# Patient Record
Sex: MALE | URBAN - METROPOLITAN AREA
[De-identification: names, ages, dates, MRNs, and addresses within clinical notes are randomized per-mention and may not be internally consistent; named-entity substitution may affect disease eponyms.]

---

## 2019-03-08 DIAGNOSIS — M54.50 LUMBAGO: Primary | ICD-10-CM

## 2019-03-29 ENCOUNTER — CLINICAL SUPPORT (OUTPATIENT)
Dept: REHABILITATION | Facility: HOSPITAL | Age: 54
End: 2019-03-29
Payer: MEDICAID

## 2019-03-29 DIAGNOSIS — G89.29 CHRONIC BILATERAL LOW BACK PAIN WITH LEFT-SIDED SCIATICA: Primary | ICD-10-CM

## 2019-03-29 DIAGNOSIS — M54.42 CHRONIC BILATERAL LOW BACK PAIN WITH LEFT-SIDED SCIATICA: Primary | ICD-10-CM

## 2019-03-29 PROCEDURE — 97161 PT EVAL LOW COMPLEX 20 MIN: CPT

## 2019-03-29 PROCEDURE — 97110 THERAPEUTIC EXERCISES: CPT

## 2019-03-29 NOTE — PLAN OF CARE
Physical Therapy Initial Evaluation     Name: Андрей Rose  Clinic Number: [id number]    Diagnosis:   Encounter Diagnosis   Name Primary?    Chronic bilateral low back pain with left-sided sciatica Yes     Physician: La Nena Fuller PA  Treatment Orders: PT Eval and Treat  No past medical history on file.  No current outpatient medications on file.     No current facility-administered medications for this visit.      Review of patient's allergies indicates:  Allergies not on file    Time In: 9:06  Time Out: 10:00    Evaluation Date: 3/29/19  Visit # authorized: 1/20  Authorization period: 6/8/19  Plan of care Expiration: 5/10/19  MD referral: M54.5 (ICD-10-CM) - Lumbago    Subjective     Patient reports he has pain every day for years. It shifts side to side. Has a lot of pain when he stands up after sitting a while, especially after driving. Feels like he gets stuck after sitting  still. He reports pain when standing and walking. Standing still worse than walking. He has some difficulty finding a comfortable spot sleeping. Condition is worsening over the last few years. Pain worse in the morning. Loosens up as he gets moving. L leg sometimes has pain radiating to foot. Sometimes on R as well. R knee with OA. Pain when squatting/bending. Pt sleeps on sides and back. Pain with ambulating stairs. Pt used to play basketball, jog, softball.   Radicular symptoms:  N/t L LE  Diagnostic Imaging: x-ray for back 2 months ago with pt reporting arthritis   Pain Scale: Андрей rates pain on a scale of 0-10 to be 3 currently; 10 at worst; 1 at best .  Onset: gradual  Aggravating factors:   Morning, bending, sitting walking  Easing factors:  Movement, pain meds  Prior Therapy: PT for knee 2017  Functional Deficits Leading to Referral: pain and limitations with functional mobility  Prior functional status: independent  Social: lives with wife and son, has stairs in home    Occupation:  Contractor, unable to perform bending , chopping                    Pts goals:  Walking, driving, playing basketball    Red flags:  Pt. denies bowel/bladder symptoms. Recent weight loss? denies Constant/Night pain that is unchanging with change of position? Denies PMH of CA? denies    Objective     Posture Alignment: slouched posture    Palpation: minimal TTP at L2 spinous process    LUMBAR SPINE AROM:   Flexion: 20% uses assist of UEs   Extension: 100%   Left Sidebend: 50% * L   Right Sidebend: 75%   Left Rotation: 75%* thoracic spine   Right Rotation: 90%     SEGMENTAL MOBILITY: decreased lumbar mobility, greatest at L2-3    LOWER EXTREMITY STRENGTH:   Left Right   Quadriceps 4+/5 5/5   Hamstrings 4+/5 4+/5   Dorsiflexion 5/5 5/5   Plantarflexion 4+/5 4+/5     Hip flexion 4/5 4/5   PGM 4/5 4+/5   Hip IR 4/5 4/5   Hip ER 4+/5 4+/5   Hip Ext 4+/5 4+/5     Dermatomes: (impaired/normal)     RLE LLE   L2 Intact Impaired: increased   L3 Intact Intact   L4 Intact Intact   L5 Intact Intact   S1 Intact Intact     Special Tests: ((+): pos.; (-): neg.)   · Quadrant test:  NT  · Slump Test: NT  · SLR Test: -  · Pirformis Test: +R  · Neural Tension:-    Flexibility:   · Decreased L HS flexibility  70 deg R 85  Ely's test: +    GAIT: Андрей displays decreased step length.   STS: pt with difficulty coming to upright position, uses UEs for assist    Pt/family was provided educational information, including: role of PT, goals for PT, scheduling - pt verbalized understanding. Discussed insurance limitations with pt.     TREATMENT     PT Evaluation Completed? Yes  Discussed Plan of Care with patient: Yes    Андрей received 10 minutes of therapeutic exercise including:  Thoracic rot with pillow with ball squeeze  PPT  Bridges  Standing ext  4x/day      Written Home Exercises Provided: HEP2GO code: [id number] (see handout)   Андрей demo good understanding of the education provided. Patient demo good return demo of skill  of exercises.    Assessment     Patient is a 53 y.o. male referred to outpatient physical therapy who presents with a PT diagnosis of chronic Low back pain demonstrating joint dysfunction, LE weakness, decrease core stability and motor control, transitions, and functional limitation as described below. Level of complexity is low;  based on patient's past medical history including the below co-morbidities and personal factors; functional limitations, and clinical presentation directly impacting his/her plan of care. Pt demonstrates good rehab potential. Pt will benefit from physcial therapy services in order to maximize pain free functional mobility. The following goals were discussed with the patient and patient is in agreement with them as to be addressed in the treatment plan. Pt was given a HEP consisting of PPT, lumbar ext, bridges, thoracic rotations. Pt instructed to discontinue standing ext if sx increase. Pt verbally understood the instructions as they were given and demonstrated proper form and technique during therapy. Pt was advised to perform these exercises free of pain, and to stop performing them if pain occurs.     History  Co-morbidities and personal factors that may impact the plan of care Examination  Body Structures and Functions, activity limitations and participation restrictions that may impact the plan of care Clinical Presentation   Decision Making/ Complexity Score   Co-morbidities:   GERD            Personal Factors:   no deficits Body Regions: back  lower extremities    Body Systems: ROM  strength  balance  gait  transfers  transitions  motor control      Activity limitations: walking  driving (bike, car, motorcycle)      Participation Restrictions: work, community ambulation, driving       stable and uncomplicated   low     Medical necessity is demonstrated by the following IMPAIRMENTS/PROBLEMS:   1) Pain limiting function   2) Posture dysfunction   3) Core/Lumbar/LE weakness   4)  Decreased thoracic/lumbar joint mobility   5) Decreased Lumbar ROM   6) Decreased soft tissue extensibility/fascia restriction   7) Decreased LE flexibility:    8) Lack of HEP   9) LE paresthesia       Pt's spiritual, cultural and educational needs considered and pt agreeable to plan of care and goals as stated below:     Anticipated Barriers for physical therapy: none    Short Term GOALS: 3 weeks. Pt agrees with goals set.  1. Patient demonstrates independence with HEP.   2. Patient demonstrates independence with Postural Awareness.   3. Patient demonstrates independence with body mechanics.     Long Term GOALS: 6 weeks. Pt agrees with goals set.  1. Patient demonstrates increased lumbar ROM by 25% to improve tolerance to functional activities.   2. Patient demonstrates increased strength BLE's by 1 MMT grade or greater to improve tolerance to functional activities.   3. Patient demonstrates improved overall function per FOTO Lumbar Survey to 44% or less.     Functional Limitations Reports - G Codes  Category: Mobility  Tool: FOTO Lumbar Survey  Score: 54% Limitation   TEST SCORE  Modifier  Impairment Limitation Restriction   0  CH  0 % impaired, limited or restricted   1-9  CI  @ least 1% but less than 20% impaired, limited or restricted   10- 19  CJ  @ least 20%<40% impaired, limited or restricted   20- 29  CK  @ least 40%<60% impaired, limited or restricted   30- 39  CL  @ least 60% <80% impaired, limited or restricted   40- 49  CM  @ least 80%<100% impaired limited or restricted   50/50  CN  100% impaired, limited or restricted     Current/: CK = 54% Limitation  Goal/ : CK = 44% Limitation     PLAN     Pt will be treated by physical therapy 1-3 times a week for 6 weeks for pt. education, HEP, aquatic therapy, therapeutic exercises, neuromuscular re-education, soft tissue and joint mobilizations; and modalities prn to achieve established goals. Андрей may at times be seen by a PTA as part of the Rehab  Team.     Therapist: Leena Leach, PT, DPT  3/29/2019    I certify the need for these services furnished under this plan of treatment and while under my care.  ______________________________ Physician/Referring Practitioner  Date of Signature

## 2019-03-29 NOTE — PROGRESS NOTES
Physical Therapy Initial Evaluation     Name: Андрей Rose  Clinic Number: [id number]    Diagnosis:   Encounter Diagnosis   Name Primary?    Chronic bilateral low back pain with left-sided sciatica Yes     Physician: La Nena Fuller PA  Treatment Orders: PT Eval and Treat  No past medical history on file.  No current outpatient medications on file.     No current facility-administered medications for this visit.      Review of patient's allergies indicates:  Allergies not on file    Time In: 9:06  Time Out: 10:00    Evaluation Date: 3/29/19  Visit # authorized: 1/20  Authorization period: 6/8/19  Plan of care Expiration: 5/10/19  MD referral: M54.5 (ICD-10-CM) - Lumbago    Subjective     Patient reports he has pain every day for years. It shifts side to side. Has a lot of pain when he stands up after sitting a while, especially after driving. Feels like he gets stuck after sitting  still. He reports pain when standing and walking. Standing still worse than walking. He has some difficulty finding a comfortable spot sleeping. Condition is worsening over the last few years. Pain worse in the morning. Loosens up as he gets moving. L leg sometimes has pain radiating to foot. Sometimes on R as well. R knee with OA. Pain when squatting/bending. Pt sleeps on sides and back. Pain with ambulating stairs. Pt used to play basketball, jog, softball.   Radicular symptoms:  N/t L LE  Diagnostic Imaging: x-ray for back 2 months ago with pt reporting arthritis   Pain Scale: Андрей rates pain on a scale of 0-10 to be 3 currently; 10 at worst; 1 at best .  Onset: gradual  Aggravating factors:   Morning, bending, sitting walking  Easing factors:  Movement, pain meds  Prior Therapy: PT for knee 2017  Functional Deficits Leading to Referral: pain and limitations with functional mobility  Prior functional status: independent  Social: lives with wife and son, has stairs in home    Occupation:  Contractor, unable to perform bending , chopping                    Pts goals:  Walking, driving, playing basketball    Red flags:  Pt. denies bowel/bladder symptoms. Recent weight loss? denies Constant/Night pain that is unchanging with change of position? Denies PMH of CA? denies    Objective     Posture Alignment: slouched posture    Palpation: minimal TTP at L2 spinous process    LUMBAR SPINE AROM:   Flexion: 20% uses assist of UEs   Extension: 100%   Left Sidebend: 50% * L   Right Sidebend: 75%   Left Rotation: 75%* thoracic spine   Right Rotation: 90%     SEGMENTAL MOBILITY: decreased lumbar mobility, greatest at L2-3    LOWER EXTREMITY STRENGTH:   Left Right   Quadriceps 4+/5 5/5   Hamstrings 4+/5 4+/5   Dorsiflexion 5/5 5/5   Plantarflexion 4+/5 4+/5     Hip flexion 4/5 4/5   PGM 4/5 4+/5   Hip IR 4/5 4/5   Hip ER 4+/5 4+/5   Hip Ext 4+/5 4+/5     Dermatomes: (impaired/normal)     RLE LLE   L2 Intact Impaired: increased   L3 Intact Intact   L4 Intact Intact   L5 Intact Intact   S1 Intact Intact     Special Tests: ((+): pos.; (-): neg.)   · Quadrant test:  NT  · Slump Test: NT  · SLR Test: -  · Pirformis Test: +R  · Neural Tension:-    Flexibility:   · Decreased L HS flexibility  70 deg R 85  Ely's test: +    GAIT: Андрей displays decreased step length.   STS: pt with difficulty coming to upright position, uses UEs for assist    Pt/family was provided educational information, including: role of PT, goals for PT, scheduling - pt verbalized understanding. Discussed insurance limitations with pt.     TREATMENT     PT Evaluation Completed? Yes  Discussed Plan of Care with patient: Yes    Андрей received 10 minutes of therapeutic exercise including:  Thoracic rot with pillow with ball squeeze  PPT  Bridges  Standing ext  4x/day      Written Home Exercises Provided: HEP2GO code: [id number] (see handout)   Андрей demo good understanding of the education provided. Patient demo good return demo of skill  of exercises.    Assessment     Patient is a 53 y.o. male referred to outpatient physical therapy who presents with a PT diagnosis of chronic Low back pain demonstrating joint dysfunction, LE weakness, decrease core stability and motor control, transitions, and functional limitation as described below. Level of complexity is low;  based on patient's past medical history including the below co-morbidities and personal factors; functional limitations, and clinical presentation directly impacting his/her plan of care. Pt demonstrates good rehab potential. Pt will benefit from physcial therapy services in order to maximize pain free functional mobility. The following goals were discussed with the patient and patient is in agreement with them as to be addressed in the treatment plan. Pt was given a HEP consisting of PPT, lumbar ext, bridges, thoracic rotations. Pt instructed to discontinue standing ext if sx increase. Pt verbally understood the instructions as they were given and demonstrated proper form and technique during therapy. Pt was advised to perform these exercises free of pain, and to stop performing them if pain occurs.     History  Co-morbidities and personal factors that may impact the plan of care Examination  Body Structures and Functions, activity limitations and participation restrictions that may impact the plan of care Clinical Presentation   Decision Making/ Complexity Score   Co-morbidities:   GERD            Personal Factors:   no deficits Body Regions: back  lower extremities    Body Systems: ROM  strength  balance  gait  transfers  transitions  motor control      Activity limitations: walking  driving (bike, car, motorcycle)      Participation Restrictions: work, community ambulation, driving       stable and uncomplicated   low     Medical necessity is demonstrated by the following IMPAIRMENTS/PROBLEMS:   1) Pain limiting function   2) Posture dysfunction   3) Core/Lumbar/LE weakness   4)  Decreased thoracic/lumbar joint mobility   5) Decreased Lumbar ROM   6) Decreased soft tissue extensibility/fascia restriction   7) Decreased LE flexibility:    8) Lack of HEP   9) LE paresthesia       Pt's spiritual, cultural and educational needs considered and pt agreeable to plan of care and goals as stated below:     Anticipated Barriers for physical therapy: none    Short Term GOALS: 3 weeks. Pt agrees with goals set.  1. Patient demonstrates independence with HEP.   2. Patient demonstrates independence with Postural Awareness.   3. Patient demonstrates independence with body mechanics.     Long Term GOALS: 6 weeks. Pt agrees with goals set.  1. Patient demonstrates increased lumbar ROM by 25% to improve tolerance to functional activities.   2. Patient demonstrates increased strength BLE's by 1 MMT grade or greater to improve tolerance to functional activities.   3. Patient demonstrates improved overall function per FOTO Lumbar Survey to 44% or less.     Functional Limitations Reports - G Codes  Category: Mobility  Tool: FOTO Lumbar Survey  Score: 54% Limitation   TEST SCORE  Modifier  Impairment Limitation Restriction   0  CH  0 % impaired, limited or restricted   1-9  CI  @ least 1% but less than 20% impaired, limited or restricted   10- 19  CJ  @ least 20%<40% impaired, limited or restricted   20- 29  CK  @ least 40%<60% impaired, limited or restricted   30- 39  CL  @ least 60% <80% impaired, limited or restricted   40- 49  CM  @ least 80%<100% impaired limited or restricted   50/50  CN  100% impaired, limited or restricted     Current/: CK = 54% Limitation  Goal/ : CK = 44% Limitation     PLAN     Pt will be treated by physical therapy 1-3 times a week for 6 weeks for pt. education, HEP, aquatic therapy, therapeutic exercises, neuromuscular re-education, soft tissue and joint mobilizations; and modalities prn to achieve established goals. Андрей may at times be seen by a PTA as part of the Rehab  Team.     Therapist: Leena Leach, PT, DPT  3/29/2019    I certify the need for these services furnished under this plan of treatment and while under my care.  ______________________________ Physician/Referring Practitioner  Date of Signature

## 2019-04-01 ENCOUNTER — CLINICAL SUPPORT (OUTPATIENT)
Dept: REHABILITATION | Facility: HOSPITAL | Age: 54
End: 2019-04-01
Payer: MEDICAID

## 2019-04-01 DIAGNOSIS — G89.29 CHRONIC BILATERAL LOW BACK PAIN WITH LEFT-SIDED SCIATICA: ICD-10-CM

## 2019-04-01 DIAGNOSIS — M54.42 CHRONIC BILATERAL LOW BACK PAIN WITH LEFT-SIDED SCIATICA: ICD-10-CM

## 2019-04-01 PROCEDURE — 97140 MANUAL THERAPY 1/> REGIONS: CPT

## 2019-04-01 PROCEDURE — 97110 THERAPEUTIC EXERCISES: CPT

## 2019-04-01 NOTE — PROGRESS NOTES
"                                                    Physical Therapy Daily Note     Name: Андрей Rose  Clinic Number: [id number]  Diagnosis:   Encounter Diagnosis   Name Primary?    Chronic bilateral low back pain with left-sided sciatica      Physician: La Nena Fuller, PA  Precautions: standard  Visit #: 2 of 20  PTA Visit #: 0  Time In: 10:00  Time Out: 10:55     Initial Evaluation Date: 3/29/19  POC Expiration: 5/10/19    Subjective     Pt reports symptoms are the same since last visit.  Pain Scale: Андрей rates pain at low back on a scale of 0-10 to be 5 currently.     Objective     Андрей received individual therapeutic exercises to develop strength, endurance, ROM, flexibility, posture and core stabilization for 47 minutes including:    Bike x 5 min    Thoracic rot with pillow with ball squeeze 20x-  PPT 20x5"-  Bridges 3 x 10-  Prone hip extensions 2 x 10-  Prone press ups 2 x 10-  Standing ext  4x/day-  LTR x 10-    Shuttle 4c 3 x 10-      Standing rows GTB 3 x 10-  Standing shoulder GTB 3 x 10-  Standing wedge stretch 2 x 1 min-  Crabwalks c/ GTB 2laps        Андрей received the following manual therapy techniques:for 8 minutes including:    PAULA thomson grade 2 and 3  STM to paraspinals    Written Home Exercises Provided: at eval  Pt demo good understanding of the education provided. Андрей demonstrated good return demonstration of activities.     Education provided re: importance of HEP  Андрей verbalized good understanding of education provided.   No spiritual or educational barriers to learning provided    Assessment     Patient tolerated all interventions well without any complaints of pain or discomfort.  Pt hypomobile throughout vertebral column. PT added multiple exercises at today's session.  Continue to progress based on pt's tolerance.  This is a 53 y.o. male referred to outpatient physical therapy and presents with a PT diagnosis of chronic low back pain and demonstrates limitations as " described in the problem list. Pt prognosis is Good. Pt will continue to benefit from skilled outpatient physical therapy to address the deficits listed in the problem list, provide pt/family education and to maximize pt's level of independence in the home and community environment.     Short Term GOALS: 3 weeks. Pt agrees with goals set.  1. Patient demonstrates independence with HEP.   2. Patient demonstrates independence with Postural Awareness.   3. Patient demonstrates independence with body mechanics.      Long Term GOALS: 6 weeks. Pt agrees with goals set.  1. Patient demonstrates increased lumbar ROM by 25% to improve tolerance to functional activities.   2. Patient demonstrates increased strength BLE's by 1 MMT grade or greater to improve tolerance to functional activities.   3. Patient demonstrates improved overall function per FOTO Lumbar Survey to 44% or less.        Plan     Continue with established Plan of Care towards PT goals.    Therapist: Brandt Monique, PT,DPT  4/1/2019

## 2019-04-05 ENCOUNTER — CLINICAL SUPPORT (OUTPATIENT)
Dept: REHABILITATION | Facility: HOSPITAL | Age: 54
End: 2019-04-05
Payer: MEDICAID

## 2019-04-05 DIAGNOSIS — G89.29 CHRONIC BILATERAL LOW BACK PAIN WITH LEFT-SIDED SCIATICA: ICD-10-CM

## 2019-04-05 DIAGNOSIS — M54.42 CHRONIC BILATERAL LOW BACK PAIN WITH LEFT-SIDED SCIATICA: ICD-10-CM

## 2019-04-05 PROCEDURE — 97110 THERAPEUTIC EXERCISES: CPT

## 2019-04-05 NOTE — PROGRESS NOTES
"                                                    Physical Therapy Daily Note     Name: Андрей Rose  Clinic Number: [id number]  Diagnosis:   No diagnosis found.  Physician: La Nena Fuller PA  Precautions: standard  Visit #: 2 of 20  PTA Visit #: 0  Time In: 9:00  Time Out: 9:55     Initial Evaluation Date: 3/29/19  POC Expiration: 5/10/19    Subjective     Pt reports symptoms are worse. States he was hanging sheet rock and it aggravated his lower back .  Pain Scale: Андрей rates pain at low back on a scale of 0-10 to be 5 currently.     Objective     Андрей received individual therapeutic exercises to develop strength, endurance, ROM, flexibility, posture and core stabilization for 47 minutes including:  MHP 10 min   Bike x 5 min    Thoracic rot with pillow with ball squeeze 20x-  PPT 20x5"-  Bridges 3 x 10-  Prone hip extensions 2 x 10-  Prone press ups 2 x 10-  Standing ext  4x/day-  LTR x 10-    Shuttle 4c 3 x 10-   NT   Standing rows GTB 3 x 10-  Standing shoulder GTB 3 x 10-  Standing wedge stretch 2 x 1 min- NT  Crabwalks c/ GTB 2laps- NT        Андрей received the following manual therapy techniques:for 8 minutes including:    PAULA thomson grade 2 and 3  STM to paraspinals    Written Home Exercises Provided: at eval  Pt demo good understanding of the education provided. Андрей demonstrated good return demonstration of activities.     Education provided re: importance of HEP  Андрей verbalized good understanding of education provided.   No spiritual or educational barriers to learning provided    Assessment     Completed therex listed above. Started with [facility]GP which gave him temporary relief. Held off on This is a 53 y.o. male referred to outpatient physical therapy and presents with a PT diagnosis of chronic low back pain and demonstrates limitations as described in the problem list. Pt prognosis is Good. Pt will continue to benefit from skilled outpatient physical therapy to address the deficits listed in " the problem list, provide pt/family education and to maximize pt's level of independence in the home and community environment.     Short Term GOALS: 3 weeks. Pt agrees with goals set.  1. Patient demonstrates independence with HEP.   2. Patient demonstrates independence with Postural Awareness.   3. Patient demonstrates independence with body mechanics.      Long Term GOALS: 6 weeks. Pt agrees with goals set.  1. Patient demonstrates increased lumbar ROM by 25% to improve tolerance to functional activities.   2. Patient demonstrates increased strength BLE's by 1 MMT grade or greater to improve tolerance to functional activities.   3. Patient demonstrates improved overall function per FOTO Lumbar Survey to 44% or less.        Plan     Continue with established Plan of Care towards PT goals.    Therapist: Kirt Rubio, PT,DPT  4/5/2019

## 2019-04-09 ENCOUNTER — CLINICAL SUPPORT (OUTPATIENT)
Dept: REHABILITATION | Facility: HOSPITAL | Age: 54
End: 2019-04-09
Payer: MEDICAID

## 2019-04-09 DIAGNOSIS — M54.42 CHRONIC BILATERAL LOW BACK PAIN WITH LEFT-SIDED SCIATICA: ICD-10-CM

## 2019-04-09 DIAGNOSIS — G89.29 CHRONIC BILATERAL LOW BACK PAIN WITH LEFT-SIDED SCIATICA: ICD-10-CM

## 2019-04-09 PROCEDURE — 97110 THERAPEUTIC EXERCISES: CPT

## 2019-04-09 NOTE — PROGRESS NOTES
"                                                    Physical Therapy Daily Note     Name: Андрей Rose  Clinic Number: [id number]  Diagnosis:   Encounter Diagnosis   Name Primary?    Chronic bilateral low back pain with left-sided sciatica      Physician: La Nena Fuller PA  Precautions: standard  Visit #: 3 of 20  PTA Visit #: 1  Time In: 730  Time Out: 8:33     Initial Evaluation Date: 3/29/19  POC Expiration: 5/10/19    Subjective     Pt reports : feeling better than the other day. Pain felt w/ certain movements  Pain Scale: Андрей rates pain at low back on a scale of 0-10 to be 5 currently. (number not verb)    Objective     Андрей received individual therapeutic exercises to develop strength, endurance, ROM, flexibility, posture and core stabilization for 47 minutes including:  MHP 10 min -post tx  Bike x 5 min (nustep today)    Thoracic rot with pillow with ball squeeze 20x-  PPT 20x5"-  Bridges 3 x 10-  Prone hip extensions 2 x 10-NT  Prone press ups 2 x 10-  TERI x 3'  LTR x 10-10" hold  Sink S 10x10"  Seated lumbar FL 2x10  Shuttle 4c 3 x 10-     Standing rows GTB 3 x 10-  Standing shoulder GTB 3 x 10-  Standing wedge stretch 2 x 1 min-   Crabwalks c/ GTB 2laps- NT        Андрей received the following manual therapy techniques:for 6 minutes including:    PA mobs grade 2 and 3-NT  STM to paraspinals    Written Home Exercises Provided: at eval  Pt demo good understanding of the education provided. Андрей demonstrated good return demonstration of activities.     Education provided re: importance of HEP  Андрей verbalized good understanding of education provided.   No spiritual or educational barriers to learning provided    Assessment     Pt shereen becca fair w/o adverse reaction.noted TERI increased LBP. FL exs seem to relieve symptoms slightly, but pt was not really clear on is symptoms were better or worse. VC needed for proper transition from supine to sit. Cont to advance as shereen.   This is a 53 y.o. male " referred to outpatient physical therapy and presents with a PT diagnosis of chronic low back pain and demonstrates limitations as described in the problem list. Pt prognosis is Good. Pt will continue to benefit from skilled outpatient physical therapy to address the deficits listed in the problem list, provide pt/family education and to maximize pt's level of independence in the home and community environment.     Short Term GOALS: 3 weeks. Pt agrees with goals set.  1. Patient demonstrates independence with HEP.   2. Patient demonstrates independence with Postural Awareness.   3. Patient demonstrates independence with body mechanics.      Long Term GOALS: 6 weeks. Pt agrees with goals set.  1. Patient demonstrates increased lumbar ROM by 25% to improve tolerance to functional activities.   2. Patient demonstrates increased strength BLE's by 1 MMT grade or greater to improve tolerance to functional activities.   3. Patient demonstrates improved overall function per FOTO Lumbar Survey to 44% or less.        Plan     Continue with established Plan of Care towards PT goals.    Therapist: Alley Cruz PTA,DPT  4/9/2019

## 2019-04-16 ENCOUNTER — CLINICAL SUPPORT (OUTPATIENT)
Dept: REHABILITATION | Facility: HOSPITAL | Age: 54
End: 2019-04-16
Payer: MEDICAID

## 2019-04-16 DIAGNOSIS — G89.29 CHRONIC BILATERAL LOW BACK PAIN WITH LEFT-SIDED SCIATICA: ICD-10-CM

## 2019-04-16 DIAGNOSIS — M54.42 CHRONIC BILATERAL LOW BACK PAIN WITH LEFT-SIDED SCIATICA: ICD-10-CM

## 2019-04-16 PROCEDURE — 97110 THERAPEUTIC EXERCISES: CPT

## 2019-04-16 NOTE — PROGRESS NOTES
"                                                    Physical Therapy Daily Note     Name: Андрей Rose  [facility] Number: [id number]  Diagnosis:   Encounter Diagnosis   Name Primary?    Chronic bilateral low back pain with left-sided sciatica      Physician: La Nena Fuller PA  Precautions: standard  Visit #: 4 of 20  PTA Visit #: 1  Time In: 7:18 (late)   Time Out: 8:05     Initial Evaluation Date: 3/29/19  POC Expiration: 5/10/19    Subjective     Pt reports : feeling better than the other day. Pain felt w/ certain movements  Pain Scale: Андрей rates pain at low back on a scale of 0-10 to be 5 currently. (number not verb)    Objective     Андрей received individual therapeutic exercises to develop strength, endurance, ROM, flexibility, posture and core stabilization for 47 minutes including:  MHP 10 min -post tx NT   Bike x 5 min (nustep today)    Thoracic rot with pillow with ball squeeze 20x-  PPT 20x5"-  Bridges 3 x 10-  Prone hip extensions 2 x 10-NT  Prone press ups 2 x 10-  TERI x 3' -NT  LTR x 10-10" hold  Sink S 10x10"  Seated lumbar FL 2x10- NT  Shuttle 4c 3 x 10-     Standing rows GTB 3 x 10-  Standing shoulder GTB 3 x 10-  Standing wedge stretch 2 x 1 min-   Crabwalks c/ GTB 2laps- NT  Step and twist holding onto beam 10x B   Core walk outs 10x B GTB         Андрей received the following manual therapy techniques:for 6 minutes including:    PAULA thomson grade 2 and 3-  STM to paraspinals    Written Home Exercises Provided: at eval  Pt demo good understanding of the education provided. Андрей demonstrated good return demonstration of activities.     Education provided re: importance of HEP  Андрей verbalized good understanding of education provided.   No spiritual or educational barriers to learning provided    Assessment     Pt shereen becca fair w/o adverse reaction.noted Rotational movements seemed to give him some relief but he sates the pain started to move to the left side.. FL exs seem to relieve symptoms " slightly, but pt was not really clear on is symptoms were better or worse. VC needed for proper transition from supine to sit. Cont to advance as shereen.   This is a 53 y.o. male referred to outpatient physical there  apy and presents with a PT diagnosis of chronic low back pain and demonstrates limitations as described in the problem list. Pt prognosis is Good. Pt will continue to benefit from skilled outpatient physical therapy to address the deficits listed in the problem list, provide pt/family education and to maximize pt's level of independence in the home and community environment.     Short Term GOALS: 3 weeks. Pt agrees with goals set.  1. Patient demonstrates independence with HEP.   2. Patient demonstrates independence with Postural Awareness.   3. Patient demonstrates independence with body mechanics.      Long Term GOALS: 6 weeks. Pt agrees with goals set.  1. Patient demonstrates increased lumbar ROM by 25% to improve tolerance to functional activities.   2. Patient demonstrates increased strength BLE's by 1 MMT grade or greater to improve tolerance to functional activities.   3. Patient demonstrates improved overall function per FOTO Lumbar Survey to 44% or less.        Plan     Continue with established Plan of Care towards PT goals.    Therapist: Kirt Rubio, PT,DPT  4/16/2019

## 2019-04-22 ENCOUNTER — CLINICAL SUPPORT (OUTPATIENT)
Dept: REHABILITATION | Facility: HOSPITAL | Age: 54
End: 2019-04-22
Payer: MEDICAID

## 2019-04-22 DIAGNOSIS — G89.29 CHRONIC BILATERAL LOW BACK PAIN WITH LEFT-SIDED SCIATICA: Primary | ICD-10-CM

## 2019-04-22 DIAGNOSIS — M54.42 CHRONIC BILATERAL LOW BACK PAIN WITH LEFT-SIDED SCIATICA: Primary | ICD-10-CM

## 2019-04-22 PROCEDURE — 97140 MANUAL THERAPY 1/> REGIONS: CPT

## 2019-04-22 PROCEDURE — 97110 THERAPEUTIC EXERCISES: CPT

## 2019-04-22 NOTE — PROGRESS NOTES
"                                                    Physical Therapy Daily Note         Name: Андрей Rose  Clinic Number: [id number]  Diagnosis:   Encounter Diagnosis   Name Primary?    Chronic bilateral low back pain with left-sided sciatica Yes     Physician: La Nena Fuller PA  Treatment Orders: PT Eval and Treat  No past medical history on file.  No current outpatient medications on file.     No current facility-administered medications for this visit.      Review of patient's allergies indicates:  Allergies not on file    Precautions: standard  Visit #: 5 of 20  PTA Visit #: 1  Time In: 7:24  Time Out: 8:30       Initial Evaluation Date: 3/29/19  POC Expiration: 5/10/19      Subjective     Pt reports he felt good after last session then pain came right back. Pain depends on position: getting out of chair, standing up too long.   Pain Scale: Андрей rates pain at back on a scale of 0-10 to be 3 currently.    Objective     Андрей received individual therapeutic exercises to develop strength, endurance, ROM, flexibility, posture and core stabilization for 46 minutes including:  MHP 10 min -post tx NT   Bike x 5 min     Thoracic rot with pillow with ball squeeze 20x-  PPT 20x5"-  Bridges 3 x 10-  DKTC red SB 2x10  Prone hip extensions 2 x 10-NT  Prone press ups 2 x 10-NT  TERI x 3' -NT  LTR x 10-10" hold  Sink S 10x10"- only 7 performed 2/2 pain upon return upright  Seated lumbar FL 2x10-NP  Shuttle 4c 3 x 10-     Standing rows GTB 3 x 10-  Standing shoulder GTB 3 x 10-  Standing wedge stretch 2 x 1 min-   [employer] c/ GTB 2laps- NT  Step and twist holding onto beam 10x B   Core walk outs 10x B GTB   HS stretch 3x30'  Open books knees flexed 1x10  Ball squeeze 20x5'    MHP provided post tx x 10 min w/o adverse effects.     Андрей received the following manual therapy techniques: Joint mobilizations were applied for 10 minutes including:  PA mobs grade 2 and 3- t spine and lumbar  STM to paraspinals    Written " Home Exercises Provided: at eval  Pt demo good understanding of the education provided. Андрей demonstrated good return demonstration of activities.     Education provided re: cont HEP  Андрей verbalized good understanding of education provided.   No spiritual or educational barriers to learning provided    Assessment     Patient tolerated treatment fairly without adverse effects. Pt with moderate stiffness within thoracic and lumbar spine, MT performed with mild relief. Pt with increased preference for flexion based exercises. Pt making progress towards goals. This is a 53 y.o. male referred to outpatient physical therapy and presents with a medical diagnosis of chronic low back pain  and demonstrates limitations as described in the problem list. Pt prognosis is Good. Pt will continue to benefit from skilled outpatient physical therapy to address the deficits listed in the problem list, provide pt/family education and to maximize pt's level of independence in the home and community environment.     Short Term GOALS: 3 weeks. Pt agrees with goals set.  1. Patient demonstrates independence with HEP.   2. Patient demonstrates independence with Postural Awareness.   3. Patient demonstrates independence with body mechanics.      Long Term GOALS: 6 weeks. Pt agrees with goals set.  1. Patient demonstrates increased lumbar ROM by 25% to improve tolerance to functional activities.   2. Patient demonstrates increased strength BLE's by 1 MMT grade or greater to improve tolerance to functional activities.   3. Patient demonstrates improved overall function per FOTO Lumbar Survey to 44% or less.        Plan     Continue with established Plan of Care towards PT goals.    Therapist: Leena Leach, PT, DPT  4/22/2019

## 2019-04-24 ENCOUNTER — CLINICAL SUPPORT (OUTPATIENT)
Dept: REHABILITATION | Facility: HOSPITAL | Age: 54
End: 2019-04-24
Payer: MEDICAID

## 2019-04-24 DIAGNOSIS — G89.29 CHRONIC BILATERAL LOW BACK PAIN WITH LEFT-SIDED SCIATICA: Primary | ICD-10-CM

## 2019-04-24 DIAGNOSIS — M54.42 CHRONIC BILATERAL LOW BACK PAIN WITH LEFT-SIDED SCIATICA: Primary | ICD-10-CM

## 2019-04-24 PROCEDURE — 97140 MANUAL THERAPY 1/> REGIONS: CPT

## 2019-04-24 PROCEDURE — 97110 THERAPEUTIC EXERCISES: CPT

## 2019-04-24 NOTE — PROGRESS NOTES
"                                                    Physical Therapy Daily Note         Name: Андрей Rose  Clinic Number: [id number]  Diagnosis:   Encounter Diagnosis   Name Primary?    Chronic bilateral low back pain with left-sided sciatica Yes     Physician: La Nena Fuller PA  Treatment Orders: PT Eval and Treat  No past medical history on file.  No current outpatient medications on file.     No current facility-administered medications for this visit.      Review of patient's allergies indicates:  Allergies not on file    Precautions: standard  Visit #: 6 of 20  PTA Visit #: 1  Time In: 7:30  Time Out: 8:38        Initial Evaluation Date: 3/29/19  POC Expiration: 5/10/19      Subjective     Pt reports He's not doing too good. He has a lot of pain when he stands up. Felt ok when he left therapy but pain began when he got in the car. Pt had difficulty sleeping due to pain.   Pain Scale: Андрей rates pain at back on a scale of 0-10 to be 10 currently.    Objective     Андрей received individual therapeutic exercises to develop strength, endurance, ROM, flexibility, posture and core stabilization for 50 minutes including:  MHP 10 min -post tx NT   Bike x 5 min     Thoracic rot with pillow with ball squeeze 20x-  PPT 20x5"-  Bridges 3 x 10-  DKTC red SB 2x10  Prone hip extensions 2 x 10-NT  Prone press ups 2 x 10-NT  TERI x 3' -NT  LTR x 10-10" hold  Sink S 10x10"- only 7 performed 2/2 pain upon return upright  Seated lumbar FL 2x10-  Shuttle 4c 3 x 10-  NP 2/2 knee pain  Standing rows GTB 3 x 10-  Standing shoulder GTB 3 x 10-  Standing wedge stretch 2 x 1 min-   [employer] c/ GTB 2laps-   Step and twist holding onto beam 10x B   Core walk outs 10x B GTB   HS stretch 3x30'  Open books knees flexed 1x10  Ball squeeze 20x5'  Piriformis stretch 2x30"  Clams 2x10  pallof 2 steps, punch out 1x10     MHP provided post tx x 10 min w/o adverse effects.       Андрей received the following manual therapy techniques: " Joint mobilizations were applied for 8 minutes including:  PA mobs grade 2 and 3- t spine and lumbar  STM to paraspinals NP      Written Home Exercises Provided: at eval   Pt demo good understanding of the education provided. Андрей demonstrated good return demonstration of activities.     Education provided re: cont HEP  Андрей verbalized good understanding of education provided.   No spiritual or educational barriers to learning provided    Assessment     Patient tolerated treatment well without adverse effects. Added stretches and exercises for core stabilization and LE strength this visit.. Pt with pain upon return to upright from flexed position. Pt making moderate progress towards goals. This is a 53 y.o. male referred to outpatient physical therapy and presents with a medical diagnosis of  chronic low back pain  and demonstrates limitations as described in the problem list. Pt prognosis is Good. Pt will continue to benefit from skilled outpatient physical therapy to address the deficits listed in the problem list, provide pt/family education and to maximize pt's level of independence in the home and community environment.     Goals as follows:    Short Term GOALS: 3 weeks. Pt agrees with goals set.  1. Patient demonstrates independence with HEP.   2. Patient demonstrates independence with Postural Awareness.   3. Patient demonstrates independence with body mechanics.      Long Term GOALS: 6 weeks. Pt agrees with goals set.  1. Patient demonstrates increased lumbar ROM by 25% to improve tolerance to functional activities.   2. Patient demonstrates increased strength BLE's by 1 MMT grade or greater to improve tolerance to functional activities.   3. Patient demonstrates improved overall function per FOTO Lumbar Survey to 44% or less.           Plan     Continue with established Plan of Care towards PT goals.    Therapist: Leena Leach, PT, DPT  4/24/2019

## 2019-04-29 ENCOUNTER — CLINICAL SUPPORT (OUTPATIENT)
Dept: REHABILITATION | Facility: HOSPITAL | Age: 54
End: 2019-04-29
Payer: MEDICAID

## 2019-04-29 DIAGNOSIS — G89.29 CHRONIC BILATERAL LOW BACK PAIN WITH LEFT-SIDED SCIATICA: Primary | ICD-10-CM

## 2019-04-29 DIAGNOSIS — M54.42 CHRONIC BILATERAL LOW BACK PAIN WITH LEFT-SIDED SCIATICA: Primary | ICD-10-CM

## 2019-04-29 PROCEDURE — 97140 MANUAL THERAPY 1/> REGIONS: CPT

## 2019-04-29 PROCEDURE — 97110 THERAPEUTIC EXERCISES: CPT

## 2019-04-29 NOTE — PROGRESS NOTES
"                                                    Physical Therapy Daily Note         Name: Андрей Rose  Clinic Number: [id number]  Diagnosis:   Encounter Diagnosis   Name Primary?    Chronic bilateral low back pain with left-sided sciatica Yes     Physician: La Nena Fuller PA  Treatment Orders: PT Eval and Treat  No past medical history on file.  No current outpatient medications on file.     No current facility-administered medications for this visit.      Review of patient's allergies indicates:  Allergies not on file    Precautions: standard  Visit #: 7 of 20  PTA Visit #: 1  Time In: 7:30  Time Out: 7:25        Initial Evaluation Date: 3/29/19  POC Expiration: 5/10/19      Subjective     Pt reports he's hurting today as usual after sitting/standing a while.   Pain Scale: Андрей rates pain at back on a scale of 0-10 to be 8 currently.    Objective     Андрей received individual therapeutic exercises to develop strength, endurance, ROM, flexibility, posture and core stabilization for 42 minutes including:  MHP 10 min -post tx NT   Bike x 5 min     Thoracic rot with pillow with ball squeeze 20x-  PPT 20x5"- NP  Bridges 3 x 10- NP  DKTC red SB 2x10  Prone hip extensions 2 x 10-NT  Prone press ups 2 x 10-NT  TERI x 3' -NT  LTR x 10-10" hold  Sink S 10x10"   Seated lumbar FL 2x10-  Shuttle 4c 3 x 10-  NP 2/2 knee pain  Standing rows GTB 3 x 10- NP  Standing shoulder ext GTB 3 x 10-  Standing wedge stretch 2 x 1 min-   [employer] c/ GTB 2laps-   Step and twist holding onto beam 10x B   Core walk outs 10x B GTB np  [employer] carry 2 laps 25# - may be able to increase weight next visit  HS stretch 3x30'  Open books knees flexed 1x10 NP  Ball squeeze 20x5'  Piriformis stretch 2x30"  Clams 2x10 NP  pallof 2 steps, punch out 1x10     MHP provided post tx x 10 min w/o adverse effects NP      Андрей received the following manual therapy techniques: Joint mobilizations and Soft tissue Mobilization were applied for 13 " minutes including:  PA mobs grade 2 and 3- t spine and lumbar  STM to paraspinals  Lumbar gapping  Lumbar manual traction    Written Home Exercises Provided: at eval  Pt demo good understanding of the education provided. Андрей demonstrated good return demonstration of activities.     Education provided re: cont hep  Андрей verbalized good understanding of education provided.   No spiritual or educational barriers to learning provided    Assessment     Patient tolerated treatment fairly. Pt continues to have increased pain when maintaining static positions. Pt educated to attempt stretches and use log roll to get out of bed in the morning. Added anti-sidebend exercise this visit and cont to work on rotational and flexion based movements. Pt making moderate progress towards goals. This is a 53 y.o. male referred to outpatient physical therapy and presents with a medical diagnosis of chronic low back pain   and demonstrates limitations as described in the problem list. Pt prognosis is Good. Pt will continue to benefit from skilled outpatient physical therapy to address the deficits listed in the problem list, provide pt/family education and to maximize pt's level of independence in the home and community environment.       Goals as follows:   Short Term GOALS: 3 weeks. Pt agrees with goals set.  1. Patient demonstrates independence with HEP.   2. Patient demonstrates independence with Postural Awareness.   3. Patient demonstrates independence with body mechanics.      Long Term GOALS: 6 weeks. Pt agrees with goals set.  1. Patient demonstrates increased lumbar ROM by 25% to improve tolerance to functional activities.   2. Patient demonstrates increased strength BLE's by 1 MMT grade or greater to improve tolerance to functional activities.   3. Patient demonstrates improved overall function per FOTO Lumbar Survey to 44% or less.        Plan     Continue with established Plan of Care towards PT goals.    Therapist:  Leena Leach, PT, DPT  4/29/2019

## 2019-05-01 ENCOUNTER — CLINICAL SUPPORT (OUTPATIENT)
Dept: REHABILITATION | Facility: HOSPITAL | Age: 54
End: 2019-05-01
Payer: MEDICAID

## 2019-05-01 DIAGNOSIS — G89.29 CHRONIC BILATERAL LOW BACK PAIN WITH LEFT-SIDED SCIATICA: Primary | ICD-10-CM

## 2019-05-01 DIAGNOSIS — M54.42 CHRONIC BILATERAL LOW BACK PAIN WITH LEFT-SIDED SCIATICA: Primary | ICD-10-CM

## 2019-05-01 PROCEDURE — 97110 THERAPEUTIC EXERCISES: CPT

## 2019-05-01 PROCEDURE — 97140 MANUAL THERAPY 1/> REGIONS: CPT

## 2019-05-01 NOTE — PROGRESS NOTES
"                                                    Physical Therapy Daily Note         Name: Андрей Rose  Clinic Number: [id number]  Diagnosis:   Encounter Diagnosis   Name Primary?    Chronic bilateral low back pain with left-sided sciatica Yes     Physician: La Nena Fuller PA  Treatment Orders: PT Eval and Treat  No past medical history on file.  No current outpatient medications on file.     No current facility-administered medications for this visit.      Review of patient's allergies indicates:  Allergies not on file       Precautions: standard  Visit #: 8 of 20  PTA Visit #: 1  Time In: 7:30  Time Out: 8:30        Initial Evaluation Date: 3/29/19  POC Expiration: 5/10/19      Subjective     Pt reports he has pain whenever he stands up  Pain Scale: Андрей rates pain at back on a scale of 0-10 to be 6 currently.    Objective     Андрей received individual therapeutic exercises to develop strength, endurance, ROM, flexibility, posture and core stabilization for 35 minutes including:  MHP 10 min prior to tx  Bike x 5 min     Thoracic rot with pillow with ball squeeze 20x- NP  PPT 20x5"- NP  Bridges 3 x 10- NP  DKTC red SB 2x10  Prone hip extensions 2 x 10-NT  Prone press ups 2 x 10-NT  TERI x 3' -NT  LTR x 10-10" hold NP  Sink S 10x10" NP  Seated lumbar FL 2x10- SB  Shuttle 4c 3 x 10-  NP 2/2 knee pain  Standing rows GTB 3 x 10- NP  Standing shoulder ext GTB 3 x 10-  Standing wedge stretch 2 x 1 min-   [employer] c/ GTB 2laps-   Step and twist holding onto beam 10x B   Core walk outs 10x B GTB np  [place] carry 2 laps 25# - may be able to increase weight next visit  HS stretch 3x30'  Open books knees flexed 1x10 NP  Ball squeeze 20x5'  Piriformis stretch 2x30"  Clams 2x10 NP  pallof 2 steps, punch out 1x10     MHP provided post tx x 10 min w/o adverse effects NP      Андрей received the following manual therapy techniques: Joint mobilizations and Soft tissue Mobilization were applied for 15 minutes " including:  PA mobs grade 2 and 3- t spine and lumbar  STM to paraspinals  Lumbar gapping NP  Lumbar manual traction NP    Cupping performed for 8 min: Pt educated on rational and risk of bruising  4 cups placed along lumbar paraspinals x5 min   Cupping with movement along paraspinals B x3 min    Written Home Exercises Provided: at eval  Pt demo good understanding of the education provided. Андрей demonstrated good return demonstration of activities.     Education provided re: cont HEP  Андрей verbalized good understanding of education provided.   No spiritual or educational barriers to learning provided    Assessment     Patient tolerated treatment fairly without adverse effects. Pt with increased tone in paraspinals and decreased jt mobility. Attempted cupping this visit. Pt educated on purpose of treatment and potential of bruising. Pt reports cupping may have helped to relieve some pain post tx, but he will have to see once he leaves and goes on with the day. Assess effects of cupping next visit. Unable to perform all there-ex 2/2 time.no new exercises this visit. Cont to progress per tolerance.  Pt making moderate progress towards goals. This is a 53 y.o. male referred to outpatient physical therapy and presents with a medical diagnosis of chronic low back pain and demonstrates limitations as described in the problem list. Pt prognosis is Good. Pt will continue to benefit from skilled outpatient physical therapy to address th2e deficits listed in the problem list, provide pt/family education and to maximize pt's level of independence in the home and community environment.       Goals as follows:   Short Term GOALS: 3 weeks. Pt agrees with goals set.  1. Patient demonstrates independence with HEP.   2. Patient demonstrates independence with Postural Awareness.   3. Patient demonstrates independence with body mechanics.      Long Term GOALS: 6 weeks. Pt agrees with goals set.  1. Patient demonstrates increased  lumbar ROM by 25% to improve tolerance to functional activities.   2. Patient demonstrates increased strength BLE's by 1 MMT grade or greater to improve tolerance to functional activities.   3. Patient demonstrates improved overall function per FOTO Lumbar Survey to 44% or less.        Plan     Continue with established Plan of Care towards PT goals.    Therapist: Leena Leach, PT, DPT  5/1/2019

## 2019-05-13 ENCOUNTER — CLINICAL SUPPORT (OUTPATIENT)
Dept: REHABILITATION | Facility: HOSPITAL | Age: 54
End: 2019-05-13
Payer: MEDICAID

## 2019-05-13 DIAGNOSIS — G89.29 CHRONIC BILATERAL LOW BACK PAIN WITH LEFT-SIDED SCIATICA: ICD-10-CM

## 2019-05-13 DIAGNOSIS — M54.42 CHRONIC BILATERAL LOW BACK PAIN WITH LEFT-SIDED SCIATICA: ICD-10-CM

## 2019-05-13 PROCEDURE — 97110 THERAPEUTIC EXERCISES: CPT

## 2019-05-13 NOTE — PROGRESS NOTES
"                                                    Physical Therapy Daily/ Discharge Note         Name: Андрей Rose  Clinic Number: [id number]  Diagnosis:   Encounter Diagnosis   Name Primary?    Chronic bilateral low back pain with left-sided sciatica      Physician: La Nena Fuller PA  Treatment Orders: PT Eval and Treat  No past medical history on file.  No current outpatient medications on file.     No current facility-administered medications for this visit.      Review of patient's allergies indicates:  Allergies not on file       Precautions: standard  Visit #: 10 of 20  PTA Visit #: 0  Time In: 7:30  Time Out: 8:00        Initial Evaluation Date: 3/29/19  POC Expiration: 5/10/19      Subjective     Pt reports that he has not had any improvements since starting therapy.  Pt is going to call to schedule an appt c/ his MD.  Pain Scale: Андрей rates pain at back on a scale of 0-10 to be 9 currently.    Objective     LUMBAR SPINE AROM:   Flexion: 20% uses assist of UEs   Extension: 100%   Left Sidebend: 50% * L   Right Sidebend: 75%   Left Rotation: 75%* thoracic spine   Right Rotation: 90%      LOWER EXTREMITY STRENGTH:    Left Right   Quadriceps 4+/5 5/5   Hamstrings 4+/5 4+/5   Dorsiflexion 5/5 5/5   Plantarflexion 4+/5 4+/5      Hip flexion 4/5 4/5   PGM 4/5 4+/5   Hip IR 4/5 4/5   Hip ER 4+/5 4+/5   Hip Ext 4+/5 4+/5       Андрей received individual therapeutic exercises to develop strength, endurance, ROM, flexibility, posture and core stabilization for 23 minutes including:  MHP 5 min prior to tx  Bike x 5 min     Thoracic rot with pillow with ball squeeze 20x- NP  PPT 20x5"- NP  Bridges 3 x 10- NP  DKTC red SB 2x10  Prone hip extensions 2 x 10-NT  Prone press ups 2 x 10-NT  TERI x 3' -NT  LTR x 10-10" hold NP  Sink S 10x10" NP  Seated lumbar FL 2x10- SB  Shuttle 4c 3 x 10-  NP 2/2 knee pain  Standing rows GTB 3 x 10- NP  Standing shoulder ext GTB 3 x 10-  Standing wedge stretch 2 x 1 min- " "  Crabwalks c/ GTB 2laps-   Step and twist holding onto beam 10x B   Core walk outs 10x B GTB np  [place] carry 2 laps 25# - may be able to increase weight next visit  HS stretch 3x30'  Open books knees flexed 1x10 NP  Ball squeeze 20x5'  Piriformis stretch 2x30"  Clams 2x10 NP  pallof 2 steps, punch out 1x10           Андрей received the following manual therapy techniques: Joint mobilizations and Soft tissue Mobilization were applied for 0 minutes including:  PA mobs grade 2 and 3- t spine and lumbar  STM to paraspinals  Lumbar gapping NP  Lumbar manual traction NP    Cupping performed for 8 min: Pt educated on rational and risk of bruising-NP  4 cups placed along lumbar paraspinals x5 min   Cupping with movement along paraspinals B x3 min    Written Home Exercises Provided: at eval  Pt demo good understanding of the education provided. Андрей demonstrated good return demonstration of activities.     Education provided re: cont HEP  Андрей verbalized good understanding of education provided.   No spiritual or educational barriers to learning provided    Assessment   Pt has not made any progress since beginning therapy. No goals have been met since initial evaluation. Pain levels remain elevated.  Pt is now discharged from outpatient PT due to lack of progress.  PT encouraged pt to contact MD c/ questions and concerns.        Goals as follows:   Short Term GOALS: 3 weeks. Pt agrees with goals set.  1. Patient demonstrates independence with HEP. (Progressing, not met)  2. Patient demonstrates independence with Postural Awareness. (Progressing, not met)  3. Patient demonstrates independence with body mechanics. (Progressing, not met)     Long Term GOALS: 6 weeks. Pt agrees with goals set.  1. Patient demonstrates increased lumbar ROM by 25% to improve tolerance to functional activities. (Not MET)  2. Patient demonstrates increased strength BLE's by 1 MMT grade or greater to improve tolerance to functional activities. " (Not MET)  3. Patient demonstrates improved overall function per FOTO Lumbar Survey to 44% or less. (Not MET)    FOTO score at discharge 64% limitation       Plan     Continue with established Plan of Care towards PT goals.    Therapist: Brandt Monique, PT, DPT  5/13/2019